# Patient Record
Sex: FEMALE | Race: OTHER | Employment: UNEMPLOYED | URBAN - METROPOLITAN AREA
[De-identification: names, ages, dates, MRNs, and addresses within clinical notes are randomized per-mention and may not be internally consistent; named-entity substitution may affect disease eponyms.]

---

## 2017-01-31 ENCOUNTER — HOSPITAL ENCOUNTER (EMERGENCY)
Age: 27
Discharge: HOME | End: 2017-01-31
Attending: EMERGENCY MEDICINE
Payer: SELF-PAY

## 2017-01-31 ENCOUNTER — OFFICE VISIT (OUTPATIENT)
Dept: FAMILY MEDICINE CLINIC | Age: 27
End: 2017-01-31

## 2017-01-31 VITALS
TEMPERATURE: 97.7 F | HEIGHT: 64 IN | OXYGEN SATURATION: 100 % | HEART RATE: 71 BPM | DIASTOLIC BLOOD PRESSURE: 63 MMHG | WEIGHT: 214 LBS | SYSTOLIC BLOOD PRESSURE: 121 MMHG | BODY MASS INDEX: 36.54 KG/M2

## 2017-01-31 VITALS
HEART RATE: 80 BPM | HEIGHT: 63 IN | DIASTOLIC BLOOD PRESSURE: 70 MMHG | OXYGEN SATURATION: 97 % | WEIGHT: 214 LBS | BODY MASS INDEX: 37.92 KG/M2 | SYSTOLIC BLOOD PRESSURE: 118 MMHG | RESPIRATION RATE: 16 BRPM | TEMPERATURE: 98.3 F

## 2017-01-31 DIAGNOSIS — Z23 ENCOUNTER FOR IMMUNIZATION: ICD-10-CM

## 2017-01-31 DIAGNOSIS — O21.9 VOMITING AFFECTING PREGNANCY: Primary | ICD-10-CM

## 2017-01-31 DIAGNOSIS — N92.6 MISSED MENSES: ICD-10-CM

## 2017-01-31 DIAGNOSIS — E87.6 HYPOKALEMIA: ICD-10-CM

## 2017-01-31 DIAGNOSIS — O21.9 NAUSEA/VOMITING IN PREGNANCY: Primary | ICD-10-CM

## 2017-01-31 LAB
ANION GAP BLD CALC-SCNC: 11 MMOL/L (ref 5–15)
APPEARANCE UR: ABNORMAL
BACTERIA URNS QL MICRO: NEGATIVE /HPF
BILIRUB UR QL CFM: POSITIVE
BUN SERPL-MCNC: 10 MG/DL (ref 6–20)
BUN/CREAT SERPL: 14 (ref 12–20)
CALCIUM SERPL-MCNC: 8.4 MG/DL (ref 8.5–10.1)
CHLORIDE SERPL-SCNC: 101 MMOL/L (ref 97–108)
CO2 SERPL-SCNC: 26 MMOL/L (ref 21–32)
COLOR UR: ABNORMAL
CREAT SERPL-MCNC: 0.72 MG/DL (ref 0.55–1.02)
EPITH CASTS URNS QL MICRO: ABNORMAL /LPF
GLUCOSE SERPL-MCNC: 89 MG/DL (ref 65–100)
GLUCOSE UR STRIP.AUTO-MCNC: NEGATIVE MG/DL
HCG URINE, QL. (POC): POSITIVE
HGB UR QL STRIP: ABNORMAL
KETONES UR QL STRIP.AUTO: 80 MG/DL
LEUKOCYTE ESTERASE UR QL STRIP.AUTO: ABNORMAL
MUCOUS THREADS URNS QL MICRO: ABNORMAL /LPF
NITRITE UR QL STRIP.AUTO: NEGATIVE
PH UR STRIP: 6 [PH] (ref 5–8)
POTASSIUM SERPL-SCNC: 3.2 MMOL/L (ref 3.5–5.1)
PROT UR STRIP-MCNC: ABNORMAL MG/DL
RBC #/AREA URNS HPF: ABNORMAL /HPF (ref 0–5)
SODIUM SERPL-SCNC: 138 MMOL/L (ref 136–145)
SP GR UR REFRACTOMETRY: 1.02 (ref 1–1.03)
UROBILINOGEN UR QL STRIP.AUTO: 1 EU/DL (ref 0.2–1)
VALID INTERNAL CONTROL?: YES
WBC URNS QL MICRO: ABNORMAL /HPF (ref 0–4)

## 2017-01-31 PROCEDURE — 96375 TX/PRO/DX INJ NEW DRUG ADDON: CPT

## 2017-01-31 PROCEDURE — 36415 COLL VENOUS BLD VENIPUNCTURE: CPT | Performed by: PHYSICIAN ASSISTANT

## 2017-01-31 PROCEDURE — 99284 EMERGENCY DEPT VISIT MOD MDM: CPT

## 2017-01-31 PROCEDURE — 81001 URINALYSIS AUTO W/SCOPE: CPT | Performed by: PHYSICIAN ASSISTANT

## 2017-01-31 PROCEDURE — 74011000250 HC RX REV CODE- 250: Performed by: PHYSICIAN ASSISTANT

## 2017-01-31 PROCEDURE — 80048 BASIC METABOLIC PNL TOTAL CA: CPT | Performed by: PHYSICIAN ASSISTANT

## 2017-01-31 PROCEDURE — 96361 HYDRATE IV INFUSION ADD-ON: CPT

## 2017-01-31 PROCEDURE — 96374 THER/PROPH/DIAG INJ IV PUSH: CPT

## 2017-01-31 PROCEDURE — 74011250636 HC RX REV CODE- 250/636: Performed by: PHYSICIAN ASSISTANT

## 2017-01-31 PROCEDURE — 74011250637 HC RX REV CODE- 250/637: Performed by: PHYSICIAN ASSISTANT

## 2017-01-31 RX ORDER — SWAB
1 SWAB, NON-MEDICATED MISCELLANEOUS DAILY
Qty: 30 TAB | Refills: 3 | Status: SHIPPED | OUTPATIENT
Start: 2017-01-31

## 2017-01-31 RX ORDER — FAMOTIDINE 10 MG/ML
20 INJECTION INTRAVENOUS
Status: COMPLETED | OUTPATIENT
Start: 2017-01-31 | End: 2017-01-31

## 2017-01-31 RX ORDER — ONDANSETRON 4 MG/1
4 TABLET, ORALLY DISINTEGRATING ORAL
Qty: 12 TAB | Refills: 0 | Status: SHIPPED | OUTPATIENT
Start: 2017-01-31

## 2017-01-31 RX ORDER — ONDANSETRON 2 MG/ML
4 INJECTION INTRAMUSCULAR; INTRAVENOUS
Status: COMPLETED | OUTPATIENT
Start: 2017-01-31 | End: 2017-01-31

## 2017-01-31 RX ORDER — POTASSIUM CHLORIDE 1.5 G/1.77G
40 POWDER, FOR SOLUTION ORAL
Status: COMPLETED | OUTPATIENT
Start: 2017-01-31 | End: 2017-01-31

## 2017-01-31 RX ADMIN — POTASSIUM CHLORIDE 40 MEQ: 1.5 POWDER, FOR SOLUTION ORAL at 18:37

## 2017-01-31 RX ADMIN — SODIUM CHLORIDE 1000 ML: 900 INJECTION, SOLUTION INTRAVENOUS at 17:55

## 2017-01-31 RX ADMIN — FAMOTIDINE 20 MG: 10 INJECTION, SOLUTION INTRAVENOUS at 18:06

## 2017-01-31 RX ADMIN — ONDANSETRON 4 MG: 2 INJECTION INTRAMUSCULAR; INTRAVENOUS at 18:02

## 2017-01-31 NOTE — ED PROVIDER NOTES
HPI Comments: Macey Hancock is a 27 y.o. female with hx significant for A0 who presents ambulatory from [facility] to ER with c/o nausea and vomiting x 8 days. Pt notes she has been vomiting multiple times a day x 8 days. Notes everything she tries to eat/drink she vomits right back up. Denies any diarrhea, abdominal pain. Notes went to [address] today and had positive pregnancy test and referred to ER for further evaluation and IV fluids. Notes unaware of pregnancy until today. LMP 17. A0. No abdominal pain, vaginal discharge, vaginal bleeding or other complaints. She specifically denies any fevers, chills, chest pain, shortness of breath, headache, rash, diarrhea, abdominal pain, urinary/bowel changes, sweating or weight loss. PCP: None   PMHx significant for: History reviewed. No pertinent past medical history. PSHx significant for: History reviewed. No pertinent surgical history. No Known Allergies    There are no other complaints, changes or physical findings at this time. The history is provided by the patient. The history is limited by a language barrier. A  was used. History reviewed. No pertinent past medical history. History reviewed. No pertinent past surgical history. History reviewed. No pertinent family history. Social History     Social History    Marital status: SINGLE     Spouse name: N/A    Number of children: N/A    Years of education: N/A     Occupational History    Not on file. Social History Main Topics    Smoking status: Never Smoker    Smokeless tobacco: Not on file    Alcohol use No    Drug use: No    Sexual activity: Not on file     Other Topics Concern    Not on file     Social History Narrative         ALLERGIES: Review of patient's allergies indicates no known allergies. Review of Systems   Constitutional: Negative. Negative for appetite change, chills, fatigue and fever. HENT: Negative.   Negative for congestion and sore throat. Eyes: Negative. Negative for visual disturbance. Respiratory: Negative. Negative for cough and shortness of breath. Cardiovascular: Negative. Negative for chest pain, palpitations and leg swelling. Gastrointestinal: Positive for nausea and vomiting. Negative for abdominal pain, constipation and diarrhea. Genitourinary: Negative. Negative for dysuria, flank pain, hematuria, vaginal bleeding and vaginal discharge. Musculoskeletal: Negative. Negative for back pain and neck pain. Skin: Negative. Negative for rash. Neurological: Negative. Negative for dizziness, syncope, weakness, numbness and headaches. Hematological: Negative. Psychiatric/Behavioral: Negative. All other systems reviewed and are negative. Vitals:    01/31/17 1706 01/31/17 1738   BP: 110/67    Pulse: 80    Resp: 16    Temp: 98.3 °F (36.8 °C)    SpO2: 100% 100%   Weight: 97.1 kg (214 lb)    Height: 5' 3\" (1.6 m)             Physical Exam   Constitutional: She is oriented to person, place, and time. She appears well-developed and well-nourished. No distress. HENT:   Head: Normocephalic and atraumatic. Mouth/Throat: Oropharynx is clear and moist.   Neck: Normal range of motion. Cardiovascular: Normal rate, S1 normal, S2 normal, normal heart sounds, intact distal pulses and normal pulses. Exam reveals no gallop and no friction rub. No murmur heard. Pulses:       Dorsalis pedis pulses are 2+ on the right side, and 2+ on the left side. Pulmonary/Chest: Effort normal and breath sounds normal. No accessory muscle usage. No respiratory distress. She has no decreased breath sounds. She has no wheezes. She has no rhonchi. She has no rales. Abdominal: Soft. Normal appearance and bowel sounds are normal. She exhibits no distension. There is no hepatosplenomegaly. There is no tenderness. There is no rebound, no guarding and no CVA tenderness. Musculoskeletal: Normal range of motion. She exhibits no edema or tenderness. Neurological: She is alert and oriented to person, place, and time. She has normal strength. No sensory deficit. Skin: Skin is warm and dry. No rash noted. She is not diaphoretic. No erythema. No pallor. Psychiatric: She has a normal mood and affect. Her behavior is normal.   Nursing note and vitals reviewed. MDM  Number of Diagnoses or Management Options  Diagnosis management comments: DDx: gastritis, electrolyte abnormality, UTI    Pt denies all abdominal pain, vaginal bleeding, and vaginal discharge in ER at this time. Rico Gomez PA-C        Amount and/or Complexity of Data Reviewed  Clinical lab tests: ordered and reviewed  Obtain history from someone other than the patient: yes ( )  Review and summarize past medical records: yes  Discuss the patient with other providers: yes (Dr. Mandi Berg)    Patient Progress  Patient progress: stable    ED Course       Procedures              6:15 PM   Rico Gomez PA-C discussed patient with Fidel Carney MD who is in agreement with care plan as outlined. Will be in to see the patient. No further recommendations. Rico Gomez PA-C         7:03 PM  Rico Gomez PA-C  into reevaluate patient at this time. Reports feeling much better. Notes nausea is improved. Tolerating PO at this time. Updated on available results. All questions answered. Will give patient remainder of IV fluids and plan to discharge home after fluids finished. Rico Gomez PA-C      7:09 PM  Pt has been reevaluated. There are no new complaints, changes, or physical findings at this time. Medications have been reviewed w/ pt and/or family. Pt and/or family's questions have been answered. Pt and/or family expressed good understanding of the dx/tx/rx and is in agreement with plan of care. Pt instructed and agreed to f/u w/ OBGYN and to return to ED upon further deterioration. Pt is ready for discharge.     LABORATORY TESTS:  Recent Results (from the past 12 hour(s))   AMB POC URINE PREGNANCY TEST, VISUAL COLOR COMPARISON    Collection Time: 01/31/17  2:23 PM   Result Value Ref Range    VALID INTERNAL CONTROL POC Yes     HCG urine, Ql. (POC) Positive Negative   METABOLIC PANEL, BASIC    Collection Time: 01/31/17  5:51 PM   Result Value Ref Range    Sodium 138 136 - 145 mmol/L    Potassium 3.2 (L) 3.5 - 5.1 mmol/L    Chloride 101 97 - 108 mmol/L    CO2 26 21 - 32 mmol/L    Anion gap 11 5 - 15 mmol/L    Glucose 89 65 - 100 mg/dL    BUN 10 6 - 20 MG/DL    Creatinine 0.72 0.55 - 1.02 MG/DL    BUN/Creatinine ratio 14 12 - 20      GFR est AA >60 >60 ml/min/1.73m2    GFR est non-AA >60 >60 ml/min/1.73m2    Calcium 8.4 (L) 8.5 - 10.1 MG/DL   URINALYSIS W/ RFLX MICROSCOPIC    Collection Time: 01/31/17  6:11 PM   Result Value Ref Range    Color DARK YELLOW      Appearance CLOUDY (A) CLEAR      Specific gravity 1.025 1.003 - 1.030      pH (UA) 6.0 5.0 - 8.0      Protein TRACE (A) NEG mg/dL    Glucose NEGATIVE  NEG mg/dL    Ketone 80 (A) NEG mg/dL    Blood TRACE (A) NEG      Urobilinogen 1.0 0.2 - 1.0 EU/dL    Nitrites NEGATIVE  NEG      Leukocyte Esterase SMALL (A) NEG      WBC 0-4 0 - 4 /hpf    RBC 0-5 0 - 5 /hpf    Epithelial cells FEW FEW /lpf    Bacteria NEGATIVE  NEG /hpf    Mucus 2+ (A) NEG /lpf   BILIRUBIN, CONFIRM    Collection Time: 01/31/17  6:11 PM   Result Value Ref Range    Bilirubin UA, confirm POSITIVE (A) NEG         IMAGING RESULTS:  No orders to display     No results found.       MEDICATIONS GIVEN:  Medications   sodium chloride 0.9 % bolus infusion 1,000 mL (not administered)   sodium chloride 0.9 % bolus infusion 1,000 mL (1,000 mL IntraVENous New Bag 1/31/17 2855)   famotidine (PF) (PEPCID) injection 20 mg (20 mg IntraVENous Given 1/31/17 1806)   ondansetron (ZOFRAN) injection 4 mg (4 mg IntraVENous Given 1/31/17 1802)   potassium chloride (KLOR-CON) packet 40 mEq (40 mEq Oral Given 1/31/17 7587)       IMPRESSION:  1. Nausea/vomiting in pregnancy    2. Hypokalemia        PLAN:  1. Current Discharge Medication List      START taking these medications    Details   ondansetron (ZOFRAN ODT) 4 mg disintegrating tablet Take 1 Tab by mouth every eight (8) hours as needed for Nausea. Qty: 12 Tab, Refills: 0      prenatal vit-iron fumarate-fa (PRENATAL TABLET) 28 mg iron- 800 mcg tab Take 1 Tab by mouth daily. Qty: 30 Tab, Refills: 3           2.    Follow-up Information     Follow up With Details Comments [address] [facility] Call in 1 day OBGYN and schedule appointment in next 3-4 days [address] [facility] [address] [phone]    [facility] EMERGENCY DEPT  If symptoms worsen [address]  [phone]            Return to ED if worse

## 2017-01-31 NOTE — ED TRIAGE NOTES
Pt reports epigastric abd pain, with N/V and HA since 0800 today. States sx's have been initially going on for 8 days, but then this episode started at 0800 today. Pt states she is pregnant, approx 6wks, 2 d pregnant. Denies vaginal bleeding or discharge. Sent by Governor [place].

## 2017-01-31 NOTE — PROGRESS NOTES
Statements below were documented by Mckinley Leung [facility]  for this patient visit wasBora Loera. Positive pregnancy test . Estimated date of delivery is September 26, 2017 . Prefers to deliver at [employer] [facility] , Information given to patient  States will call and schedule prenatal  appointment. Request for flu vaccine, patient denies any egg or latex allergy. Patient given VIS sheet and information about flu shot, verbalizes understanding and has no questions. Flu shot administered per protocol after administering injection, patient waited for 15 minutes. Patient showed no signs or symptoms of  allergic reactionPatient denies redness or itching from IM injection site. Being sent to [facility] ER per provider for vomiting x 8 days for further evaluation.  Mckinley Leung RN

## 2017-01-31 NOTE — PROGRESS NOTES
Results for orders placed or performed in visit on 01/31/17   AMB POC URINE PREGNANCY TEST, VISUAL COLOR COMPARISON   Result Value Ref Range    VALID INTERNAL CONTROL POC Yes     HCG urine, Ql. (POC) Positive Negative     Lot:[id number] Exp:2018/06/30

## 2017-01-31 NOTE — PROGRESS NOTES
Assessment/Plan:       ICD-10-CM ICD-9-CM    1. Missed menses N92.6 626.4 AMB POC URINE PREGNANCY TEST, VISUAL COLOR COMPARISON     Follow-up Disposition: Not on File    [facility]  Subjective:   Linwood Perez is a 27 y.o. OTHER female who speaks Greek. Chief Complaint   Patient presents with    Vomiting     pt c/o vomiting, headaches and feeling weak x8 days    History of Present Illness: not keeping anything down. Has an 6year old, was this way with her last pregnancy. Had a period of vomiting for 2 weeks during that time. Review of Systems: Negative for: fever, chest pain, shortness of breath, leg swelling, exertional dyspnea, palpitations. Past Surgical History: She  has no past surgical history on file. Social History: She  reports that she has never smoked. She does not have any smokeless tobacco history on file. She reports that she does not drink alcohol or use illicit drugs. Objective:     Vitals:    01/31/17 1412   BP: 121/63   Pulse: 71   Temp: 97.7 °F (36.5 °C)   TempSrc: Oral   SpO2: 100%   Weight: 214 lb (97.1 kg)   Height: 5' 3.7\" (1.618 m)    Patient's last menstrual period was 12/18/2016. Wt Readings from Last 2 Encounters:   01/31/17 214 lb (97.1 kg)     Lab Review:  Results for orders placed or performed in visit on 01/31/17   AMB POC URINE PREGNANCY TEST, VISUAL COLOR COMPARISON   Result Value Ref Range    VALID INTERNAL CONTROL POC Yes     HCG urine, Ql. (POC) Positive Negative      Physical Examination: poor skin turgor. General appearance - well developed, no acute distress. Chest - clear to auscultation. Heart - regular rate and rhythm without murmurs, rubs, or gallops. Assessment/Plan:   Nikko Russell was seen today for vomiting.     Diagnoses and all orders for this visit:    Missed menses  -     AMB POC URINE PREGNANCY TEST, VISUAL COLOR COMPARISON      Follow-up Disposition: Not on File  Rani Cabrera, MSN, RN, FNP-BC, BC-ADM  Linwood Perez expressed understanding of this plan.

## 2017-02-01 NOTE — DISCHARGE INSTRUCTIONS
We hope that we have addressed all of your medical concerns. The examination and treatment you received in the Emergency Department were for an emergent problem and were not intended as complete care. It is important that you follow up with your healthcare provider(s) for ongoing care. If your symptoms worsen or do not improve as expected, and you are unable to reach your usual health care provider(s), you should return to the Emergency Department. Today's healthcare is undergoing tremendous change, and patient satisfaction surveys are one of the many tools to assess the quality of medical care. You may receive a survey from the [employer] regarding your experience in the Emergency Department. I hope that your experience has been completely positive, particularly the medical care that I provided. As such, please participate in the survey; anything less than excellent does not meet my expectations or intentions. [facility] and [address] [place] [address] participate in nationally recognized quality of care measures. If your blood pressure is greater than 120/80, as reported below, we urge that you seek medical care to address the potential of high blood pressure, commonly known as hypertension. Hypertension can be hereditary or can be caused by certain medical conditions, pain, stress, or \"white coat syndrome. \"       Please make an appointment with your health care provider(s) for follow up of your Emergency Department visit. VITALS:   Patient Vitals for the past 8 hrs:   Temp Pulse Resp BP SpO2   01/31/17 1738 - - - - 100 %   01/31/17 1706 98.3 °F (36.8 °C) 80 16 110/67 100 %          Thank you for allowing us to provide you with medical care today. We realize that you have many choices for your emergency care needs. Please choose us in the future for any continued health care needs. [facility]: [phone]            Recent Results (from the past 24 hour(s))   AMB POC URINE PREGNANCY TEST, VISUAL COLOR COMPARISON    Collection Time: 01/31/17  2:23 PM   Result Value Ref Range    VALID INTERNAL CONTROL POC Yes     HCG urine, Ql. (POC) Positive Negative   METABOLIC PANEL, BASIC    Collection Time: 01/31/17  5:51 PM   Result Value Ref Range    Sodium 138 136 - 145 mmol/L    Potassium 3.2 (L) 3.5 - 5.1 mmol/L    Chloride 101 97 - 108 mmol/L    CO2 26 21 - 32 mmol/L    Anion gap 11 5 - 15 mmol/L    Glucose 89 65 - 100 mg/dL    BUN 10 6 - 20 MG/DL    Creatinine 0.72 0.55 - 1.02 MG/DL    BUN/Creatinine ratio 14 12 - 20      GFR est AA >60 >60 ml/min/1.73m2    GFR est non-AA >60 >60 ml/min/1.73m2    Calcium 8.4 (L) 8.5 - 10.1 MG/DL   URINALYSIS W/ RFLX MICROSCOPIC    Collection Time: 01/31/17  6:11 PM   Result Value Ref Range    Color DARK YELLOW      Appearance CLOUDY (A) CLEAR      Specific gravity 1.025 1.003 - 1.030      pH (UA) 6.0 5.0 - 8.0      Protein TRACE (A) NEG mg/dL    Glucose NEGATIVE  NEG mg/dL    Ketone 80 (A) NEG mg/dL    Blood TRACE (A) NEG      Urobilinogen 1.0 0.2 - 1.0 EU/dL    Nitrites NEGATIVE  NEG      Leukocyte Esterase SMALL (A) NEG      WBC 0-4 0 - 4 /hpf    RBC 0-5 0 - 5 /hpf    Epithelial cells FEW FEW /lpf    Bacteria NEGATIVE  NEG /hpf    Mucus 2+ (A) NEG /lpf   BILIRUBIN, CONFIRM    Collection Time: 01/31/17  6:11 PM   Result Value Ref Range    Bilirubin UA, confirm POSITIVE (A) NEG         No results found. Manejo de las náuseas matutinas (del [facility]): Instrucciones de cuidado - [ Managing Morning Sickness: Care Instructions ]  Instrucciones de cuidado  Para muchas mujeres, la parte más difícil del principio del embarazo son las náuseas matutinas. Las náuseas matutinas pueden ser leves o anthony con ataques de vómito. Los síntomas pueden ser peores en la [place], aunque pueden presentarse en cualquier momento del día o la noche.   Si tiene náuseas, vómito, o ambos, busque [employer]. Puede aster medidas sencillas en el hogar para [employer] náuseas del [facility]. Estas incluyen [place] qué y cuándo come y evitar ciertos alimentos y [place]. Algunas mujeres descubren que la acupuntura y las bandas de acupresión en las [address] son de [place]. La atención de seguimiento es tana parte clave de iglesias tratamiento y seguridad. Asegúrese de hacer y acudir a todas las citas, y llame a iglesias médico si está teniendo problemas. También es tana buena idea saber los resultados de los exámenes y mantener tana lista de los medicamentos que daniela. ¿Cómo puede cuidarse en el [facility]? · Tenga comida en el estómago, chantal no demasiada a la vez. Las náuseas podrían empeorar si tiene el estómago vacío. Consuma dhruv o seis comidas pequeñas al día, en lugar de skyla comidas abundantes. · Para las náuseas matutinas, coma un refrigerio pequeño, ramona un par de [employer] o secas, antes de levantarse. Permita que iglesias estómago se asiente blu unos minutos antes de salir despacio de la cama. · Rosanna abundantes líquidos, suficientes para que iglesias orina sea de color amarillo anish o transparente ramona el agua. Si tiene tana enfermedad del riñón, del corazón o del hígado y tiene que Abhilash's líquidos, hable con iglesias médico antes de aumentar iglesias consumo. El té de [place] a algunas mujeres con las náuseas. · Coma más proteína, ramona leoncio, pescado, carne magra, frijoles (habichuelas), nueces y semillas. · Consuma alimentos con carbohidratos, ramona gonzales, cereales integrales, arroz y pasta. · Evite los olores y [employer] den náuseas. Los alimentos condimentados o grasosos, los jugos cítricos, la [place], [place] café y el té con Alonna Clayton a menudo las náuseas. · No rosanna alcohol. · No fume. Trate de no estar [employer]. Si necesita ayuda para dejar de fumar, hable con iglesias médico sobre programas y medicamentos para dejar de fumar. Estos pueden aumentar esau probabilidades de dejar el hábito para siempre. · Si está tomando suplementos de agapito, pregúntele a santizo médico si son necesarios. El agapito puede [employer]. · Descanse mucho. El estrés y [address] náuseas matutinas. · Pregúntele a santizo médico si puede aster medicamentos recetas o productos de venta chapito, ramona vitamina B6, doxilamina o jengibre, para [place]. Santizo médico puede decirle cuál es la dosis cleaning para usted. · [place] esau vitaminas prenatales en la noche con el estómago lleno. ¿Cuándo debe pedir ayuda? Llame a santizo médico ahora mismo o busque atención médica inmediata si:  · Siente demasiado malestar estomacal ramona para beber líquidos. · Tiene síntomas de deshidratación, tales ramona:  ¨ Ojos secos y boca seca. ¨ [place] orina de color oscuro. ¨ Más sed de la habitual.  · Tiene nuevos síntomas, ramona diarrea, fiebre o dolor abdominal.  Preste especial atención a los cambios en santizo daksha y asegúrese de comunicarse con santizo médico si:  · Pierde peso. · Tiene náuseas y vómito continuos. ¿Dónde puede encontrar más información en inglés? Juan Pablo Jiménez a http://dee-jagdish.info/. Jessica Mohan [id number] en la búsqueda para aprender más acerca de \"Manejo de las náuseas matutinas (del [facility]): Instrucciones de cuidado - [ Managing Morning Sickness: Care Instructions ]. \"  Revisado: 30 wu, 2016  Versión del contenido: 11.1  © 1143-7518 [employer], [employer]. Las instrucciones de cuidado fueron adaptadas bajo licencia por [employer] (which disclaims liability or warranty for this information). Si usted tiene [place] afección médica o sobre estas instrucciones, siempre pregunte a santizo profesional de daksha. [employer] niega toda garantía o responsabilidad por santizo uso de esta información.            Hipocalemia: Instrucciones de cuidado - [ Hypokalemia: Care Instructions ]  Instrucciones de Cindi Poser hipocalemia es un nivel bajo de potasio. El corazón, los músculos, los riñones y el sistema nervioso necesitan potasio para funcionar correctamente. Nayeli problema tiene muchas [employer]. Los problemas renales, la alimentación y los medicamentos ramona diuréticos y laxantes pueden causarla. [address] En algunos casos, el cáncer es la causa. Iglesias médico puede hacerle pruebas para determinar la causa de esau niveles bajos de [place]. Es posible que necesite medicamentos para hacer que esau niveles de potasio vuelvan a la normalidad. También podría tener que hacerse análisis de cristian con regularidad para revisar el potasio. Si tiene un nivel de potasio muy bajo, podría necesitar medicamentos por vía intravenosa (IV). También podría necesitar pruebas para revisar la actividad eléctrica de iglesias corazón. Los problemas del corazón causados por los bajos niveles de potasio pueden ser [employer]. La atención de seguimiento es tana parte clave de iglesias tratamiento y seguridad. Asegúrese de hacer y acudir a todas las citas, y llame a iglesias médico si está teniendo problemas. También es tana buena idea saber los resultados de los exámenes y mantener tana lista de los medicamentos que daniela. ¿Cómo puede cuidarse en el hogar? · Si el médico lo recomienda, consuma alimentos que tengan [employer]. Estos incluyen frutas frescas, jugos y verduras. [address], los frijoles (habichuelas) y [address]. · Sea [place] con los medicamentos. Si iglesias médico le receta medicamentos o suplementos de potasio, tómelos según las indicaciones. Llame a iglesias médico si tiene algún problema con los medicamentos. · Hágase análisis para revisar esau niveles de potasio con la frecuencia que le indique el médico.  ¿Cuándo debe pedir ayuda? Llame al 911 en cualquier momento que considere que necesita atención de [place]. Por ejemplo, llame si:  · Siente que iglesias corazón omite latidos.  [address] causados por los bajos niveles de potasio pueden provocar la [place]. · Se desmayó (perdió el conocimiento). · Tiene un episodio de convulsiones. Llame a iglesias médico ahora mismo o busque atención médica inmediata si:  · Se siente débil o inusualmente cansado. · Tiene anthony calambres en los brazos o las piernas. · Tiene hormigueo o entumecimiento. · Siente el estómago revuelto, o vomita. · Tiene retortijones abdominales. · Está abotagado o estreñido. · Necesita orinar con mucha frecuencia. · Tiene mucha sed la mayor parte del [place]. · Siente mareos o aturdimiento, o que está a punto de [place]. · Se siente deprimido o pierde el contacto con la realidad. Preste especial atención a los cambios en iglesias daksha y asegúrese de comunicarse con iglesias médico si:  · No mejora ramona se esperaba. ¿Dónde puede encontrar más información en inglés? Sammie Vicente a http://dee-jagdish.info/. Sophia Adams [id number] en la búsqueda para aprender más acerca de \"Hipocalemia: Instrucciones de cuidado - [ Hypokalemia: Care Instructions ]. \"  Revisado: 28 julio, 2016  Versión del contenido: 11.1  © 4854-0227 Healthwise, Incorporated. Las instrucciones de cuidado fueron adaptadas bajo licencia por [employer] Connections (which disclaims liability or warranty for this information). Si usted tiene [place] afección médica o sobre estas instrucciones, siempre pregunte a iglesias profesional de daksha. Healthwise, Incorporated niega toda garantía o responsabilidad por iglesias uso de esta información.

## 2021-12-01 ENCOUNTER — TRANSCRIBE ORDER (OUTPATIENT)
Dept: SCHEDULING | Age: 31
End: 2021-12-01

## 2021-12-01 DIAGNOSIS — N63.20 LEFT BREAST MASS: Primary | ICD-10-CM

## 2021-12-02 ENCOUNTER — HOSPITAL ENCOUNTER (OUTPATIENT)
Dept: MAMMOGRAPHY | Age: 31
Discharge: HOME | End: 2021-12-02

## 2021-12-02 ENCOUNTER — OFFICE VISIT (OUTPATIENT)
Dept: FAMILY PLANNING/WOMEN'S HEALTH CLINIC | Age: 31
End: 2021-12-02

## 2021-12-02 DIAGNOSIS — N63.0 BREAST MASS: Primary | ICD-10-CM

## 2021-12-02 DIAGNOSIS — N63.20 LEFT BREAST MASS: ICD-10-CM

## 2021-12-02 PROCEDURE — 77062 BREAST TOMOSYNTHESIS BI: CPT

## 2021-12-02 PROCEDURE — 76642 ULTRASOUND BREAST LIMITED: CPT

## 2021-12-02 NOTE — PROGRESS NOTES
[facility] HISTORY QUESTIONNAIRE       No Yes Comments   Has a doctor ever seen or felt anything wrong with your breast? [x]                                  []                                     Have you ever had a breast biopsy? [x]                                  []                                          When and where was last mammogram performed? [facility]    Have you ever been told that there was a problem on your mammogram?   No Yes Comments   [x]                                  []                                       Do you have breast implants? No Yes Comments   [x]                                  []                                       When was your last Pap test performed? [facility] 1 week ago    Have you ever had an abnormal Pap test?   No Yes Comments   [x]                                  []                                       Have you had a hysterectomy? No Yes Comments (why)   [x]                                  []                                       Have you been through menopause? No Yes Date of LMP   [x]                                  []                                  11/25/21     Did your mother take NARAYAN? No Yes Unknown   []                                  []                                  X     Do you have a history of HIV exposure? No Yes    [x]                                  []                                       Have you ever been diagnosed with any type of Cancer   No Yes Comments (type,when,where,type of treatment   [x]                                  []                                          Has a family member been diagnosed with breast or ovarian cancer?    No Yes Comments (which family members, and type   [x]                                  []                                       Are you taking hormone replacement therapy (HRT)     No Yes Comments   [x]                                  []                                       How many times have you been pregnant? 12    Number of live births ? 17    Are you experiencing any of the following? No Yes Comments   Nipple Discharge [x]                                  []                                     Breast Lump/Masses []                                  [x]                                  Left breast, felt by [facility]. Pain comes and goes. Breast Skin Changes [x]                                  []                                          No Yes Comments   Vaginal Discharge []                                  [x]                                  [facility] at [facility]   Abnormal/unusual vaginal bleeding []                                  [x]                                  Pt being seen at [facility] had PAP 1 week ago. Are you experiencing any other health problems? Age at first period? 17  Age at first birth?19    Ht--5'3\"    Wt--210

## 2021-12-02 NOTE — PROGRESS NOTES
HISTORY OF PRESENT ILLNESS  Karsten Kelsey is a 36 y.o. female here for E[facility]. HPI Ms. Ezequiel Ramirez, has had a left breast lump for over a month. She was seen at [facility] for this and was sent here. She denies nipple discharge, skin changes/retraction/dimpling. The right breast is normal. She has never had breast imaging before. LMP 11/25/21 with monthly cycles. She denies use of hormones. Her Pap/WWE are UTD- recently completed at the [facility] with normal findings. Review of Systems   Constitutional: Negative. Respiratory: Negative. Cardiovascular: Negative. Skin: Negative. Physical Exam  Nursing note reviewed. Constitutional:       Appearance: Normal appearance. Chest:   Breasts: Breasts are symmetrical.      Right: No swelling, bleeding, inverted nipple, mass, nipple discharge, skin change, tenderness, axillary adenopathy or supraclavicular adenopathy. Left: Mass present. No swelling, bleeding, inverted nipple, nipple discharge, skin change, tenderness, axillary adenopathy or supraclavicular adenopathy. Lymphadenopathy:      Upper Body:      Right upper body: No supraclavicular, axillary or pectoral adenopathy. Left upper body: No supraclavicular, axillary or pectoral adenopathy. Skin:     General: Skin is warm and dry. Neurological:      Mental Status: She is alert and oriented to person, place, and time. Psychiatric:         Mood and Affect: Mood normal.         Behavior: Behavior normal.         Thought Content: Thought content normal.         Judgment: Judgment normal.         ASSESSMENT and PLAN  1. EWL  2. CBE       -left lateral breast with 2 masses  3.  Diagnostic US today

## 2021-12-10 ENCOUNTER — TELEPHONE (OUTPATIENT)
Dept: FAMILY PLANNING/WOMEN'S HEALTH CLINIC | Age: 31
End: 2021-12-10

## 2021-12-22 ENCOUNTER — TELEPHONE (OUTPATIENT)
Dept: FAMILY PLANNING/WOMEN'S HEALTH CLINIC | Age: 31
End: 2021-12-22

## 2021-12-22 NOTE — TELEPHONE ENCOUNTER
Nurse telephone and spoke with patient in re: need to see breast specialist as recommended by provider and radiologist.     [facility] staff member will make an appt. For patient and will call patietn back with appt. Details. Patient is available any day and prefers afternoons.

## 2022-01-10 ENCOUNTER — OFFICE VISIT (OUTPATIENT)
Dept: SURGERY | Age: 32
End: 2022-01-10
Payer: COMMERCIAL

## 2022-01-10 VITALS
WEIGHT: 280.5 LBS | SYSTOLIC BLOOD PRESSURE: 144 MMHG | HEART RATE: 85 BPM | DIASTOLIC BLOOD PRESSURE: 80 MMHG | BODY MASS INDEX: 49.69 KG/M2

## 2022-01-10 DIAGNOSIS — N64.4 BREAST PAIN: Primary | ICD-10-CM

## 2022-01-10 PROCEDURE — 99203 OFFICE O/P NEW LOW 30 MIN: CPT | Performed by: SURGERY

## 2022-01-10 NOTE — PATIENT INSTRUCTIONS

## 2022-01-10 NOTE — PROGRESS NOTES
HISTORY OF PRESENT ILLNESS  Mehdi Alvarez is a 36 y.o. female. HPI  NEW patient consult referred by  [facility] for LEFT breast lump. Patient has had lump for 2 years. More recently pain has gotten worse. Patient 7/10 today.  on phone is Ros. Family History:none      [facility] Results (most recent):  Results from [place] encounter on 12/02/21    [facility] 3D ZAKI W MAMMO BI DX INCL CAD    Narrative  DIGITAL BILATERAL TOMOSYNTHESIS MAMMOGRAM  AND BREAST ULTRASOUND:    INDICATION / HISTORY:  Left breast mass. No family history provided. COMPARISON:No comparison. This baseline. TECHNIQUE:  Computer Aided Detection (CAD) was used in evaluating this  mammogram.    MAMMOGRAM FINDINGS: Bilateral MLO and CC cc view and tomosynthesis imaging  performed, including spot compression imaging of the left upper lateral breast  palpable region. Scattered fibroglandular tissue. No suspicious mass,  calcification, architectural distortion. No skin thickening or evident axillary  adenopathy. ULTRASOUND FINDINGS: Targeted left breast ultrasound performed. In the left  lateral breast, patient directed palpable, there is relative firmness of tissue  without definitive mass. Patient indicates area of palpable at 4:00 position 15  cm from nipple: no sonographic abnormal cystic or solid structure or  architectural distortion. Impression  1. No mammographic or focal sonographic evidence of malignancy or imaging  correlate to palpable. 2. Area of left palpable demonstrates firmness of the tissue and pain with  palpation, without focal mass. Recommend clinical management, referral to breast  surgeon. RESULT CODE: ACR BI-RADS category 2, benign imaging appearance. FOLLOWUP CODE: Clinical management/surgical referral.    Discussed findings and recommendations with Ms Bogdan Adrian with assistance of [place]ian  interpretation line Nataliia Aguilar).       According to the [employer], yearly mammograms are recommended  starting at age 36 and continuing as long as a woman is in good health. Clinical Breast Exams should be a part of a periodic health exam - about every  three years for women in their 25s and 35s and every year for women 40 and over. Breast self exam is an option for women starting in their 25s. Any breast  change noted on a breast self exam should be reported promptly to the patient's  healthcare provider. Breast MRI is recommended for women with an approximately  20-25% or greater lifetime risk of breast cancer, including women with a strong  family history of breast or ovarian cancer and women who have been treated for  Hodgkin's disease. A negative Mammography report should not discourage follow up or biopsy of a  clinically significant finding and/or abnormality. Dense breast tissue may obscure small neoplasms. No past medical history on file. No past surgical history on file. Social History     Socioeconomic History    Marital status: SINGLE     Spouse name: Not on file    Number of children: Not on file    Years of education: Not on file    Highest education level: Not on file   Occupational History    Not on file   Tobacco Use    Smoking status: Never Smoker    Smokeless tobacco: Never Used   Substance and Sexual Activity    Alcohol use: No    Drug use: No    Sexual activity: Not on file   Other Topics Concern    Not on file   Social History Narrative    Not on file     Social Determinants of Health     Financial Resource Strain:     Difficulty of Paying Living Expenses: Not on file   Food Insecurity:     Worried About Running Out of Food in the Last Year: Not on file    Swapnil of Food in the Last Year: Not on file   Transportation Needs:     Lack of Transportation (Medical): Not on file    Lack of Transportation (Non-Medical):  Not on file   Physical Activity:     Days of Exercise per Week: Not on file    Minutes of Exercise per Session: Not on file   Stress:     Feeling of Stress : Not on file   Social Connections:     Frequency of Communication with Friends and Family: Not on file    Frequency of Social Gatherings with Friends and Family: Not on file    Attends Jainism Services: Not on file    Active Member of [address] [place] [address] Live Calendars or Organizations: Not on file    Attends Club or Organization Meetings: Not on file    Marital Status: Not on file   Intimate Partner Violence:     Fear of Current or Ex-Partner: Not on file    Emotionally Abused: Not on file    Physically Abused: Not on file    Sexually Abused: Not on file   Housing Stability:     Unable to Pay for Housing in the Last Year: Not on file    Number of [place] in the Last Year: Not on file    Unstable Housing in the Last Year: Not on file     OB History        2    Para   2    Term   2            AB        Living           SAB        IAB        Ectopic        Molar        Multiple        Live Births   2          Obstetric Comments   Menarche 8, LMP Dec 2021, # of children 2, age of 4st delivery 14, Hysterectomy/oophorectomy no/no, Breast bx no, history of breast feeding no, BCP yes, Hormone therapy no             Current Outpatient Medications:     ondansetron (ZOFRAN ODT) 4 mg disintegrating tablet, Take 1 Tab by mouth every eight (8) hours as needed for Nausea. (Patient not taking: Reported on 2021), Disp: 12 Tab, Rfl: 0    prenatal vit-iron fumarate-fa (PRENATAL TABLET) 28 mg iron- 800 mcg tab, Take 1 Tab by mouth daily. (Patient not taking: Reported on 2021), Disp: 30 Tab, Rfl: 3  No Known Allergies    Review of Systems   All other systems reviewed and are negative. Physical Exam  Vitals and nursing note reviewed. Chest:   Breasts: Breasts are symmetrical.      Right: No inverted nipple, mass, nipple discharge, skin change, tenderness, axillary adenopathy or supraclavicular adenopathy. Left: Tenderness present.  No inverted nipple, mass, nipple discharge, skin change, axillary adenopathy or supraclavicular adenopathy. Lymphadenopathy:      Cervical: No cervical adenopathy. Upper Body:      Right upper body: No supraclavicular, axillary or pectoral adenopathy. Left upper body: No supraclavicular, axillary or pectoral adenopathy. ASSESSMENT and PLAN    ICD-10-CM ICD-9-CM    1. Breast pain  N64.4 611.71      Discussed negative us results with  [facility]  No mass. Discussed breast pain which is not typically a sign of breast cancer. I have given the patient a breast pain sheet. She may try vitamin E, Evening Primrose Oil, or decreased caffeine intake if breast pain persists. 30 minutes was spent with patient on counseling and coordination of care.

## 2022-08-23 ENCOUNTER — TELEPHONE (OUTPATIENT)
Dept: SLEEP MEDICINE | Age: 32
End: 2022-08-23

## 2022-08-23 NOTE — TELEPHONE ENCOUNTER
Clorinda Kayser / [facility] to schedule an office visit for the patient. Phone # [phone].     Clorinda Kayser from [facility] called to reschedule appointment because she stated there could be only a certain number of patients with each provider- phone #[phone]

## 2022-09-15 ENCOUNTER — OFFICE VISIT (OUTPATIENT)
Dept: SLEEP MEDICINE | Age: 32
End: 2022-09-15

## 2022-09-15 VITALS
DIASTOLIC BLOOD PRESSURE: 72 MMHG | HEART RATE: 96 BPM | BODY MASS INDEX: 49.95 KG/M2 | OXYGEN SATURATION: 99 % | SYSTOLIC BLOOD PRESSURE: 115 MMHG | HEIGHT: 63 IN | WEIGHT: 281.9 LBS

## 2022-09-15 DIAGNOSIS — G47.33 OSA (OBSTRUCTIVE SLEEP APNEA): Primary | ICD-10-CM

## 2022-09-15 DIAGNOSIS — E66.01 MORBID OBESITY WITH BMI OF 45.0-49.9, ADULT (HCC): ICD-10-CM

## 2022-09-15 PROCEDURE — 99204 OFFICE O/P NEW MOD 45 MIN: CPT | Performed by: SPECIALIST

## 2022-09-15 NOTE — PROGRESS NOTES
[address] [facility] [place], [address]  Tel.  [phone]  Fax. [address] [facility] [address]  [place], [address] [facility]  Tel.  [phone]  Fax. [phone] [address]  Tel.  [phone]  Fax. [phone]         Chief Complaint       Chief Complaint   Patient presents with    Sleep Problem     Np refd for sleep coonsult       HPI      Stacy Bassett is 36 y.o. female seen for evaluation of a sleep disorder. Venezuelan interpretive services employed [facility] # [id number])    The patient reports she has experienced snoring and daytime fatigue. Normally retires between 10-11 PM and will get a bed at 5 AM.  May awakens once during the night. Has been told of snoring described as loud. During the past 2 years has been experiencing more prominent daytime fatigue and nonrestorative sleep. May doze if seated and inactive such as when watching TV, seated quietly after lunch. Denies vivid dreaming or nightmares, sleep talking or sleepwalking, bruxism or nocturnal incontinence, abnormal arm  movements, hypnagogue hallucinations, sleep paralysis or cataplexy. The patient has not undergone diagnostic testing for the current problems. Tolley Sleepiness Score: 15       No Known Allergies    Current Outpatient Medications   Medication Sig Dispense Refill    ondansetron (ZOFRAN ODT) 4 mg disintegrating tablet Take 1 Tab by mouth every eight (8) hours as needed for Nausea. (Patient not taking: No sig reported) 12 Tab 0    prenatal vit-iron fumarate-fa (PRENATAL TABLET) 28 mg iron- 800 mcg tab Take 1 Tab by mouth daily. (Patient not taking: No sig reported) 30 Tab 3        She  has no past medical history on file. She  has no past surgical history on file. She family history is not on file. She  reports that she has never smoked.  She has never used smokeless tobacco. She reports that she does not drink alcohol and does not use drugs.     Review of Systems:  ROS      Objective:   Visit Vitals  /72 (BP 1 Location: Left upper arm, BP Patient Position: Sitting)   Pulse 96   Ht 5' 3\" (1.6 m)   Wt 281 lb 14.4 oz (127.9 kg)   SpO2 99%   BMI 49.94 kg/m²     Body mass index is 49.94 kg/m². General:   cooperative   Eyes:  Pupils equal and reactive, no nystagmus,    Oropharynx:   Mallampati score IV, tongue scalloped, narrow posterior oral airway       Neck:   No carotid bruits; Neck circ. in \"inches\": 19.75   Chest/Lungs:  Clear on auscultation    CVS:  Normal rate, regular rhythm   Skin:  Warm to touch; no obvious rashes   Neuro:  face symmetrical, tongue movement normal   Psych:  Normal affect,  normal countenance        Assessment:       ICD-10-CM ICD-9-CM    1. VIRAJ (obstructive sleep apnea)  G47.33 327.23 SLEEP STUDY UNATTENDED, 4 CHANNEL      2. Morbid obesity with BMI of 45.0-49.9, adult ([facility])  E66.01 278.01 SLEEP STUDY UNATTENDED, 4 CHANNEL    Z68.42 V85.42         History consistent with sleep disordered breathing. Patient has a narrow posterior oral airway. Sleep breathing abnormalities may be more prominent when supine, and/or in rem sleep. Would benefit from weight reduction. Was advised that weight loss measures often more effective when sleep disordered breathing concurrently treated. Plan:     Orders Placed This Encounter    SLEEP STUDY UNATTENDED, 4 CHANNEL     Order Specific Question:   Reason for Exam     Answer:   fatigue       * Patient has a history and examination consistent with the diagnosis of sleep apnea. * S Home leep testing was ordered for initial evaluation. * She was provided information on sleep apnea including corresponding risk factors and the importance of proper treatment. * Treatment options if indicated were reviewed today. Instructions: The patient would benefit from weight reduction measures.   Do not engage in activities requiring a normal degree of alertness if fatigue is present. The patient understands that untreated or undertreated sleep apnea could impair judgement and the ability to function normally during the day. Call or return if symptoms worsen or persist.          Addie Tatum MD, [facility]  Electronically signed 09/15/22       This note was created using voice recognition software. Despite editing, there may be syntax errors. This note will not be viewable in [address]

## 2022-11-17 ENCOUNTER — HOSPITAL ENCOUNTER (OUTPATIENT)
Dept: SLEEP MEDICINE | Age: 32
Discharge: HOME | End: 2022-11-17
Payer: SUBSIDIZED

## 2022-11-17 ENCOUNTER — OFFICE VISIT (OUTPATIENT)
Dept: SLEEP MEDICINE | Age: 32
End: 2022-11-17

## 2022-11-17 DIAGNOSIS — G47.33 OSA (OBSTRUCTIVE SLEEP APNEA): Primary | ICD-10-CM

## 2022-11-17 PROCEDURE — 95806 SLEEP STUDY UNATT&RESP EFFT: CPT | Performed by: SPECIALIST

## 2022-11-17 NOTE — PROGRESS NOTES
[address] [facility], [facility]  Tel.  [phone]  Fax. [address] [facility], [address] [facility]  Tel.  [phone]  Fax. [phone] [address] [facility] [place]   Tel.  [phone]  Fax. [phone]       S>Antonia Saravia is a 36 y.o. female seen today to receive a home sleep testing unit (HST). Patient was educated on proper hookup and operation of the HST. Instruction forms and documentation were reviewed and signed. The patient demonstrated good understanding of the HST.    O>    There were no vitals taken for this visit. A>  No diagnosis found. P>  General information regarding operations and maintenance of the device was provided. She was provided information on sleep apnea including coresponding risk factors and the importance of proper treatment. Follow-up appointment was made to return the HST. She will be contacted once the results have been reviewed. She was asked to contact our office for any problems regarding her home sleep test study.     [id number]

## 2022-11-22 ENCOUNTER — TELEPHONE (OUTPATIENT)
Dept: SLEEP MEDICINE | Age: 32
End: 2022-11-22

## 2022-11-22 DIAGNOSIS — G47.33 OSA (OBSTRUCTIVE SLEEP APNEA): Primary | ICD-10-CM

## 2022-11-27 NOTE — TELEPHONE ENCOUNTER
HSAT demonstrated sleep disordered breathing characterized by an overall AHI of 7/h associated with minimal SPO2 of 77%. Snoring during 30.6%. Events grouped consistent with REM-related sleep apnea. Recommendation: APAP 7-18 cm    Sleep technologist: Please advise patient of HSAT results. Order has been entered for APAP 7-18 cm. Please schedule first compliance appointment.

## 2022-12-01 ENCOUNTER — TELEPHONE (OUTPATIENT)
Dept: FAMILY PLANNING/WOMEN'S HEALTH CLINIC | Age: 32
End: 2022-12-01